# Patient Record
Sex: FEMALE | Race: ASIAN
[De-identification: names, ages, dates, MRNs, and addresses within clinical notes are randomized per-mention and may not be internally consistent; named-entity substitution may affect disease eponyms.]

---

## 2022-05-30 ENCOUNTER — HOSPITAL ENCOUNTER (EMERGENCY)
Dept: HOSPITAL 76 - ED | Age: 37
Discharge: HOME | End: 2022-05-30
Payer: COMMERCIAL

## 2022-05-30 ENCOUNTER — HOSPITAL ENCOUNTER (OUTPATIENT)
Dept: HOSPITAL 76 - EMS | Age: 37
Discharge: ANOTHER HOSPITAL | End: 2022-05-30
Payer: COMMERCIAL

## 2022-05-30 VITALS — SYSTOLIC BLOOD PRESSURE: 128 MMHG | DIASTOLIC BLOOD PRESSURE: 80 MMHG

## 2022-05-30 DIAGNOSIS — M79.672: ICD-10-CM
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
## 2022-05-30 DIAGNOSIS — M79.662: ICD-10-CM

## 2022-05-30 DIAGNOSIS — S82.312A: Primary | ICD-10-CM
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
## 2022-05-30 DIAGNOSIS — M25.572: Primary | ICD-10-CM

## 2022-05-30 PROCEDURE — 99283 EMERGENCY DEPT VISIT LOW MDM: CPT

## 2022-05-30 PROCEDURE — 73590 X-RAY EXAM OF LOWER LEG: CPT

## 2022-05-30 PROCEDURE — 96374 THER/PROPH/DIAG INJ IV PUSH: CPT

## 2022-05-30 PROCEDURE — 73610 X-RAY EXAM OF ANKLE: CPT

## 2022-05-30 PROCEDURE — 99284 EMERGENCY DEPT VISIT MOD MDM: CPT

## 2022-05-30 NOTE — ED PHYSICIAN DOCUMENTATION
PD HPI LOWER EXT INJURY





- Stated complaint


Stated Complaint: ANKLE INJ





- Chief complaint


Chief Complaint: Trauma Ext





- History obtained from


History obtained from: Patient, Family, EMS





- Additional information


Additional information: 


The patient comes to the emergency department via EMS for chief complaint of 

left lower leg pain after [place] beach and striking her leg

on another rock.  Patient denies any other injuries.  No prior injury to this 

leg.  The patient was not able to walk after the incident, secondary to the 

pain.  No other complaints at this time.








Review of Systems


Ten Systems: 10 systems reviewed and negative


Constitutional: reports: Reviewed and negative


Eyes: reports: Reviewed and negative


Ears: reports: Reviewed and negative


Nose: reports: Reviewed and negative


Throat: reports: Reviewed and negative


Cardiac: reports: Reviewed and negative


Respiratory: reports: Reviewed and negative


GI: reports: Reviewed and negative


: reports: Reviewed and negative


Skin: reports: Reviewed and negative


Musculoskeletal: reports: Extremity pain, Extremity swelling, Pain with weight 

bearing


Neurologic: reports: Reviewed and negative


Psychiatric: reports: Reviewed and negative


Endocrine: reports: Reviewed and negative


Immunocompromised: reports: Reviewed and negative





PD PAST MEDICAL HISTORY





- Present Medications


Home Medications: 


                                Ambulatory Orders











 Medication  Instructions  Recorded  Confirmed


 


HYDROcod/ACETAM 5/325 [Norco 5/325] 1 - 2 tablet PO Q6H PRN #14 tablet 05/30/22 


 


Ondansetron Odt [Zofran] 4 mg TL Q6H PRN #10 tablet 05/30/22 














- Allergies


Allergies/Adverse Reactions: 


                                    Allergies











Allergy/AdvReac Type Severity Reaction Status Date / Time


 


No Known Drug Allergies Allergy   Verified 05/30/22 13:06














PD ED PE NORMAL





- Vitals


Vital signs reviewed: Yes





- General


General: Alert and oriented X 3, No acute distress, Well developed/nourished





- HEENT


HEENT: Atraumatic, PERRL, EOMI, Moist mucous membranes





- Neck


Neck: Supple, no meningeal sign





- Cardiac


Cardiac: Strong equal pulses





- Respiratory


Respiratory: No respiratory distress





- Derm


Derm: Normal color, Warm and dry, No rash





- Extremities


Extremities: No deformity, Other (Tenderness palpation over the anteromedial 

aspect of the patient's distal left anterior tibial area.  No obvious deformity.

  No Point tenderness of the ankle.  No tenderness at the knee.)





- Neuro


Neuro: Alert and oriented X 3





- Psych


Psych: Normal mood, Normal affect





Results





- Vitals


Vitals: 





                               Vital Signs - 24 hr











  05/30/22





  13:06


 


Temperature 36.5 C


 


Heart Rate 74


 


Respiratory 20





Rate 


 


Blood Pressure 136/88 H


 


O2 Saturation 98








                                     Oxygen











O2 Source                      Room air

















- Rads (name of study)


  ** Left ankle x-ray series


Radiology: Final report received, See rad report





  ** Left tib-fib x-ray series


Radiology: Final report received, See rad report





PD MEDICAL DECISION MAKING





- ED course


Complexity details: considered differential, d/w patient


ED course: 





The patient was worked up with x-rays of the tib-fib and ankle and found to have

 a torus fracture of the distal left tibia without angulation and minimal 

displacement.  She was placed in a posterior mold splint in the ED and given a 

pair crutches.  She had been treated in route with morphine and was given 0.5 mg

 of Dilaudid here, as well.  I have given the patient and her , who are 

from [facility] a few options for orthopedic follow-up in the [facility].  However, they do have a primary care physician and may seek referral thr

gh their PCP instead.  I have emphasized to the patient and  that it 

will be important for them to follow-up within the week to have orthopedic 

evaluation and casting of the leg.  I have electronically transmitted a 

prescription for analgesia to [facility] at the 's request.





Departure





- Departure


Disposition: 01 Home, Self Care


Clinical Impression: 


Tibial fracture


Qualifiers:


 Encounter type: initial encounter Tibia location: shaft Fracture type: closed 

Fracture morphology: spiral Fracture alignment: nondisplaced Laterality: left 

Qualified Code(s): S82.245A - Nondisplaced spiral fracture of shaft of left 

tibia, initial encounter for closed fracture





Condition: Stable


Instructions:  ED Fx Lower Ext


Follow-Up: 


Jagdeep Rowland MD [Physician No Access] - 


Deuce Galindo MD [Physician No Access] - 


PHIL MAYA DO [Physician No Access] - 


Prescriptions: 


HYDROcod/ACETAM 5/325 [Norco 5/325] 1 - 2 tablet PO Q6H PRN #14 tablet


 PRN Reason: Pain


Ondansetron Odt [Zofran] 4 mg TL Q6H PRN #10 tablet


 PRN Reason: Nausea / Vomiting


Comments: 


Your x-ray shows a break in the lower part of your tibia or "shin bone".  You 

have been placed in a splint and put on crutches because of this.  It is very 

important that you do not bear weight on the broken leg until your orthopedist 

is cleared you to do so.  You should use the crutches anytime you want to get 

around.  It is very important that you follow-up with orthopedics within the 

week to be reevaluated and have your leg casted.  You may follow-up in one of 

the recommended clinics, or you may go through your primary doctor to be 

referred to orthopedics for follow-up.  Please take the pain medication 

prescribed as needed.  This has been electronically transmitted to [facility] 

pharmacy in [place], on [address].

## 2022-05-30 NOTE — XRAY REPORT
PROCEDURE:  Ankle 3 View LT

 

INDICATIONS:  Trauma

 

TECHNIQUE:  3 views of the ankle were acquired.  

 

COMPARISON:  Correlation is made with the accompanying tibia and fibula study.

 

FINDINGS:  

 

Bones: There is a mildly displaced fracture of the distal tibia. No significant fracture of the dista
l fibular can be seen. No definite widening of the ankle syndesmosis can be seen.  The talar dome dem
onstrates an unremarkable appearance.  No suspicious lytic or blastic lesions are seen.  

 

Soft tissues: Mild soft tissue swelling is seen.

 

 

 

IMPRESSION:  Mildly displaced distal tibial fracture.

 

Reviewed by: Steven Crane MD on 5/30/2022 1:00 PM TYRELL

Approved by: Steven Crane MD on 5/30/2022 1:00 PM TYRELL

 

 

Station ID:  [id number]

## 2022-05-30 NOTE — XRAY REPORT
PROCEDURE:  Tib/Fib LT

 

INDICATIONS:  Trauma

 

TECHNIQUE:  2 views of the tibia and fibula were acquired.  

 

COMPARISON:  Correlation is made with the accompanying ankle plain films, 5/30/2022.

 

FINDINGS:  

 

Bones: There is a mildly displaced fracture seen involving the distal tibia. No associated fibular fr
acture is identified. 

 

Soft tissues:  No suspicious soft tissue calcifications or masses.  

 

 

 

IMPRESSION:  

 

Mildly displaced distal tibial fracture.

 

Reviewed by: Steven Crane MD on 5/30/2022 12:59 PM TYRELL

Approved by: Steven Crane MD on 5/30/2022 12:59 PM TYRELL

 

 

Station ID:  [id number]